# Patient Record
Sex: FEMALE | URBAN - METROPOLITAN AREA
[De-identification: names, ages, dates, MRNs, and addresses within clinical notes are randomized per-mention and may not be internally consistent; named-entity substitution may affect disease eponyms.]

---

## 2021-05-14 ENCOUNTER — WEB ENCOUNTER (OUTPATIENT)
Dept: URBAN - METROPOLITAN AREA CLINIC 42 | Facility: CLINIC | Age: 31
End: 2021-05-14

## 2021-05-14 ENCOUNTER — OFFICE VISIT (OUTPATIENT)
Dept: URBAN - METROPOLITAN AREA CLINIC 42 | Facility: CLINIC | Age: 31
End: 2021-05-14
Payer: COMMERCIAL

## 2021-05-14 ENCOUNTER — DASHBOARD ENCOUNTERS (OUTPATIENT)
Age: 31
End: 2021-05-14

## 2021-05-14 DIAGNOSIS — Z09 SURGERY FOLLOW-UP: ICD-10-CM

## 2021-05-14 PROCEDURE — 99203 OFFICE O/P NEW LOW 30 MIN: CPT | Performed by: INTERNAL MEDICINE

## 2021-05-14 NOTE — HPI-TODAY'S VISIT:
The patient is following up from recent hospitalization.  She went to the ER for abdominal pain and was found to have a possible foreign body in the RLQ on CT scan.  She subsequently had an ex lap that showed an anastomosis and possible staples from a previous surgery/small bowel resection.  The strange thing is that she is unable to tell me what surgery that she has done in the past.  Currently does not have any active GI symptoms.